# Patient Record
Sex: MALE | Race: WHITE | Employment: OTHER | URBAN - METROPOLITAN AREA
[De-identification: names, ages, dates, MRNs, and addresses within clinical notes are randomized per-mention and may not be internally consistent; named-entity substitution may affect disease eponyms.]

---

## 2020-07-09 ENCOUNTER — HOSPITAL ENCOUNTER (EMERGENCY)
Age: 85
Discharge: HOME | End: 2020-07-09
Attending: EMERGENCY MEDICINE | Admitting: EMERGENCY MEDICINE
Payer: MEDICARE

## 2020-07-09 ENCOUNTER — APPOINTMENT (OUTPATIENT)
Dept: GENERAL RADIOLOGY | Age: 85
End: 2020-07-09
Attending: EMERGENCY MEDICINE
Payer: MEDICARE

## 2020-07-09 VITALS
HEIGHT: 73 IN | SYSTOLIC BLOOD PRESSURE: 132 MMHG | WEIGHT: 191.2 LBS | RESPIRATION RATE: 18 BRPM | TEMPERATURE: 98.1 F | BODY MASS INDEX: 25.34 KG/M2 | HEART RATE: 60 BPM | OXYGEN SATURATION: 97 % | DIASTOLIC BLOOD PRESSURE: 67 MMHG

## 2020-07-09 DIAGNOSIS — S41.111A SKIN TEAR OF RIGHT UPPER ARM WITHOUT COMPLICATION, INITIAL ENCOUNTER: ICD-10-CM

## 2020-07-09 DIAGNOSIS — S81.812A LACERATION OF LEFT LOWER EXTREMITY, INITIAL ENCOUNTER: Primary | ICD-10-CM

## 2020-07-09 PROCEDURE — 74011000250 HC RX REV CODE- 250: Performed by: EMERGENCY MEDICINE

## 2020-07-09 PROCEDURE — 90715 TDAP VACCINE 7 YRS/> IM: CPT | Performed by: EMERGENCY MEDICINE

## 2020-07-09 PROCEDURE — 99284 EMERGENCY DEPT VISIT MOD MDM: CPT

## 2020-07-09 PROCEDURE — 74011250636 HC RX REV CODE- 250/636: Performed by: EMERGENCY MEDICINE

## 2020-07-09 PROCEDURE — 75810000293 HC SIMP/SUPERF WND  RPR

## 2020-07-09 PROCEDURE — 73590 X-RAY EXAM OF LOWER LEG: CPT

## 2020-07-09 PROCEDURE — 90471 IMMUNIZATION ADMIN: CPT

## 2020-07-09 RX ORDER — CHOLECALCIFEROL (VITAMIN D3) 125 MCG
CAPSULE ORAL
COMMUNITY

## 2020-07-09 RX ORDER — LIDOCAINE HYDROCHLORIDE 20 MG/ML
10 INJECTION, SOLUTION EPIDURAL; INFILTRATION; INTRACAUDAL; PERINEURAL
Status: COMPLETED | OUTPATIENT
Start: 2020-07-09 | End: 2020-07-09

## 2020-07-09 RX ORDER — DONEPEZIL HYDROCHLORIDE 10 MG/1
10 TABLET, FILM COATED ORAL
COMMUNITY

## 2020-07-09 RX ORDER — LANOLIN ALCOHOL/MO/W.PET/CERES
1000 CREAM (GRAM) TOPICAL DAILY
COMMUNITY

## 2020-07-09 RX ORDER — POTASSIUM CHLORIDE 20 MEQ/1
10 TABLET, EXTENDED RELEASE ORAL DAILY
COMMUNITY

## 2020-07-09 RX ORDER — FLUTICASONE PROPIONATE 50 MCG
2 SPRAY, SUSPENSION (ML) NASAL DAILY
COMMUNITY

## 2020-07-09 RX ORDER — MINERAL OIL
180 ENEMA (ML) RECTAL
COMMUNITY

## 2020-07-09 RX ADMIN — LIDOCAINE HYDROCHLORIDE 200 MG: 20 INJECTION, SOLUTION EPIDURAL; INFILTRATION; INTRACAUDAL; PERINEURAL at 16:11

## 2020-07-09 RX ADMIN — TETANUS TOXOID, REDUCED DIPHTHERIA TOXOID AND ACELLULAR PERTUSSIS VACCINE, ADSORBED 0.5 ML: 5; 2.5; 8; 8; 2.5 SUSPENSION INTRAMUSCULAR at 16:17

## 2020-07-09 RX ADMIN — BACITRACIN ZINC, NEOMYCIN SULFATE, POLYMYXIN B SULFATE 1 PACKET: 3.5; 5000; 4 OINTMENT TOPICAL at 14:03

## 2020-07-09 NOTE — ED PROVIDER NOTES
Patient is a 70-year-old male with history of Alzheimer's disease, atrial flutter with pacemaker placed, essential tremor presented emergency department for evaluation of leg laceration sustained after ground-level fall. Patient arrives via EMS from nursing home. He reports that he was walking, was wearing long pants, and tripped on his pants causing him to fall on the floor. Denies blow to head, denies loss of consciousness. He is able to fully remember the fall. He is complaining of leg pain with laceration, area was bandaged by nurse at his residence facility. He denies headache, neck pain, chest pain, shortness of breath, abdominal pain, hip pain, difficulty walking, or any other medical at this time. He is not on blood thinners. He is unsure when his last tetanus immunization was. Past Medical History:   Diagnosis Date    Alzheimer disease ([facility] [address]     \"memory loss\" per EMS report sheet    Atrial flutter ([facility] [address]     Cataract     Diverticulitis     Dizziness     Pacemaker     Rhinitis     chronic rhinitis    Sick sinus syndrome (HCC)     Tremor     Essential Hand Tremor    Vitamin B 12 deficiency     Vitamin D deficiency        History reviewed. No pertinent surgical history. History reviewed. No pertinent family history.     Social History     Socioeconomic History    Marital status:      Spouse name: Not on file    Number of children: Not on file    Years of education: Not on file    Highest education level: Not on file   Occupational History    Not on file   Social Needs    Financial resource strain: Not on file    Food insecurity     Worry: Not on file     Inability: Not on file    Transportation needs     Medical: Not on file     Non-medical: Not on file   Tobacco Use    Smoking status: Never Smoker    Smokeless tobacco: Never Used   Substance and Sexual Activity    Alcohol use: Yes     Frequency: Never    Drug use: Never    Sexual activity: Not on file Lifestyle    Physical activity     Days per week: Not on file     Minutes per session: Not on file    Stress: Not on file   Relationships    Social connections     Talks on phone: Not on file     Gets together: Not on file     Attends Mos[employer] service: Not on file     Active member of club or organization: Not on file     Attends meetings of clubs or organizations: Not on file     Relationship status: Not on file    Intimate partner violence     Fear of current or ex partner: Not on file     Emotionally abused: Not on file     Physically abused: Not on file     Forced sexual activity: Not on file   Other Topics Concern    Not on file   Social History Narrative    Not on file         ALLERGIES: Patient has no known allergies. Review of Systems   Constitutional: Negative for chills and fever. HENT: Negative for ear pain and sore throat. Eyes: Negative for visual disturbance. Respiratory: Negative for cough and shortness of breath. Cardiovascular: Negative for chest pain. Gastrointestinal: Negative for abdominal pain. Genitourinary: Negative for flank pain. Musculoskeletal: Positive for myalgias (Left lower leg pain). Negative for back pain and gait problem. Skin: Positive for wound (Skin tear to the right elbow; laceration to the left anterior shin). Negative for color change. Neurological: Negative for dizziness, syncope, numbness and headaches. Hematological: Bruises/bleeds easily. Psychiatric/Behavioral: Negative for confusion. Vitals:    07/09/20 1331   BP: 123/66   Pulse: 60   Resp: 18   Temp: 98.1 °F (36.7 °C)   SpO2: 98%   Weight: 86.7 kg (191 lb 3.2 oz)   Height: 6' 1\" (1.854 m)            Physical Exam  Vitals signs and nursing note reviewed. Constitutional:       General: He is not in acute distress. Appearance: Normal appearance. He is not ill-appearing. HENT:      Head: Normocephalic and atraumatic. No raccoon eyes, Murguia's sign or contusion. Mouth/Throat:      Pharynx: Oropharynx is clear. Eyes:      General: No visual field deficit. Extraocular Movements: Extraocular movements intact. Conjunctiva/sclera: Conjunctivae normal.      Pupils: Pupils are equal, round, and reactive to light. Neck:      Musculoskeletal: No neck rigidity. Cardiovascular:      Rate and Rhythm: Normal rate and regular rhythm. Heart sounds: Normal heart sounds. Pulmonary:      Effort: Pulmonary effort is normal. No respiratory distress. Breath sounds: Normal breath sounds. No wheezing or rales. Chest:      Chest wall: No tenderness. Abdominal:      General: There is no distension. Palpations: Abdomen is soft. Tenderness: There is no abdominal tenderness. There is no right CVA tenderness, left CVA tenderness or guarding. Musculoskeletal: Normal range of motion. General: No deformity. Right hip: Normal.      Left hip: Normal.      Left knee: Normal. He exhibits normal range of motion, no swelling and no effusion. Left ankle: Normal. He exhibits normal range of motion, no swelling, no deformity, no laceration and normal pulse. No tenderness. Cervical back: Normal. He exhibits normal range of motion, no tenderness and no bony tenderness. Thoracic back: Normal. He exhibits no tenderness and no bony tenderness. Lumbar back: Normal. He exhibits normal range of motion, no tenderness and no bony tenderness. Right upper arm: He exhibits laceration (skin tear). He exhibits no tenderness and no bony tenderness. Right forearm: Normal. He exhibits no tenderness, no bony tenderness and no swelling. Skin:     General: Skin is warm and dry. Neurological:      General: No focal deficit present. Mental Status: He is alert and oriented to person, place, and time. GCS: GCS eye subscore is 4. GCS verbal subscore is 5. GCS motor subscore is 6. Cranial Nerves: Cranial nerves are intact.  No cranial nerve deficit, dysarthria or facial asymmetry. Sensory: Sensation is intact. Motor: Motor function is intact. Coordination: Coordination is intact. Coordination normal.      Gait: Gait is intact. Psychiatric:         Mood and Affect: Mood normal.            MDM  Number of Diagnoses or Management Options  Laceration of left lower extremity, initial encounter:   Skin tear of right upper arm without complication, initial encounter:   Diagnosis management comments: Patient is a 70-year-old male presented emergency department for mechanical ground-level fall, no blow to head, no loss of consciousness, no focal neurologic deficits on exam.  Wife is at bedside. No external signs of basilar skull fracture or head injury. Patient is not on blood thinners. Head CT not indicated at this time. Sustained superficial skin tear to the right elbow and 4.5 cm laceration to the left anterior lower leg. No tenderness or decreased range of motion of right upper extremity or left lower extremity. Patient has very thin skin and bruises easily. Plan: X-ray of left leg, irrigate and approximate wound, tetanus immunization, monitor, reassess        Amount and/or Complexity of Data Reviewed  Tests in the radiology section of CPT®: reviewed      ED Course as of Jul 09 1552   Thu Jul 09, 2020   1521 IMPRESSION: No acute abnormality. XR TIBIA AND FIBULA LEFT [EH]      ED Course User Index  [EH] YANG Wade     The wound is cleansed, irrigated, and debrided of foreign material as much as possible. Wound edges approximated, antibiotic ointment and dressing applied. 8 sutures placed to left leg. Dermabond applied to skin tears of right elbow. See procedure note for details. The patient tolerated entire procedure well and is alerted to watch for any signs of infection (redness, pus, pain, increased swelling or fever) and call if such occurs. Home wound care instructions are provided.  Tetanus vaccination status reviewed: Td vaccination indicated and given today. Pt has been reevaluated. There are no new complaints, changes, or physical findings at this time. Medications have been reviewed w/ pt and/or family. Pt and/or family's questions have been answered. Pt and/or family expressed good understanding of the dx/tx/rx and is in agreement with plan of care. Pt instructed and agreed to f/u w/ PCP and ED for suture removal and to return to ED upon further deterioration. Pt is ready for discharge. IMPRESSION:  1. Laceration of left lower extremity, initial encounter    2. Skin tear of right upper arm without complication, initial encounter        PLAN:  1. Discharge Medication List as of 7/9/2020  4:29 PM        2. Follow-up Information     Follow up With Specialties Details Why Contact Info    Your Primary Care Provider  Go in 1 week As needed     [facility] EMERGENCY DEPT Emergency Medicine Go to  10-14 days, For suture removal [address] [place] [id number]            Return to ED if worse         Wound Repair    Date/Time: 7/9/2020 4:10 PM  Performed by: [facility] provider: Fidel Barrera  Preparation: skin prepped with Betadine  Pre-procedure re-eval: Immediately prior to the procedure, the patient was reevaluated and found suitable for the planned procedure and any planned medications.   Location details: left leg  Wound length:2.6 - 7.5 cm (4.5)  Anesthesia: local infiltration    Anesthesia:  Local Anesthetic: lidocaine 1% without epinephrine  Anesthetic total: 8 mL  Foreign bodies: no foreign bodies  Irrigation solution: saline  Irrigation method: syringe  Debridement: none  Skin closure: 5-0 nylon  Number of sutures: 8  Technique: simple and interrupted  Approximation: close  Dressing: antibiotic ointment, gauze roll and pressure dressing  Patient tolerance: Patient tolerated the procedure well with no immediate complications  My total time at bedside, performing this procedure was 16-30 minutes.

## 2020-07-09 NOTE — ED TRIAGE NOTES
Patient arrives via EMS from [address] [facility]. Per EMS, patient had a GLF at home this morning while walking from the bathroom. The patient reportedly tripped over his \"long pants\" and fell, hitting his head. The patient denies LOC. The patient has an abrasion on posterior aspect of head, the patient has a laceration on both the left shin and right upper arm. Patient reports pain in left shin. Patient denies headache, dizziness, SOB, chest pain, N/V.     Pmhx: alzheimer, cataracts, diverticulitis, essential tremor, a-flutter, sick sinus syndrome (s/p dual chamber pacer), vit-d deficiency, low vitamin B 12, chronic rhinities, and \"dizziness at times. \"